# Patient Record
Sex: FEMALE | Race: AMERICAN INDIAN OR ALASKA NATIVE
[De-identification: names, ages, dates, MRNs, and addresses within clinical notes are randomized per-mention and may not be internally consistent; named-entity substitution may affect disease eponyms.]

---

## 2021-07-03 ENCOUNTER — HOSPITAL ENCOUNTER (EMERGENCY)
Dept: HOSPITAL 46 - ED | Age: 41
Discharge: HOME | End: 2021-07-03
Payer: COMMERCIAL

## 2021-07-03 VITALS — BODY MASS INDEX: 25.9 KG/M2 | WEIGHT: 164.99 LBS | HEIGHT: 67 IN

## 2021-07-03 DIAGNOSIS — Z79.899: ICD-10-CM

## 2021-07-03 DIAGNOSIS — Z79.52: ICD-10-CM

## 2021-07-03 DIAGNOSIS — U07.1: Primary | ICD-10-CM

## 2021-07-03 DIAGNOSIS — L25.9: ICD-10-CM

## 2021-07-03 DIAGNOSIS — F17.200: ICD-10-CM

## 2021-07-03 PROCEDURE — C9803 HOPD COVID-19 SPEC COLLECT: HCPCS

## 2021-07-03 PROCEDURE — U0003 INFECTIOUS AGENT DETECTION BY NUCLEIC ACID (DNA OR RNA); SEVERE ACUTE RESPIRATORY SYNDROME CORONAVIRUS 2 (SARS-COV-2) (CORONAVIRUS DISEASE [COVID-19]), AMPLIFIED PROBE TECHNIQUE, MAKING USE OF HIGH THROUGHPUT TECHNOLOGIES AS DESCRIBED BY CMS-2020-01-R: HCPCS

## 2022-04-17 ENCOUNTER — HOSPITAL ENCOUNTER (EMERGENCY)
Dept: HOSPITAL 46 - ED | Age: 42
Discharge: HOME | End: 2022-04-17
Payer: COMMERCIAL

## 2022-04-17 VITALS — WEIGHT: 164.99 LBS | HEIGHT: 67 IN | BODY MASS INDEX: 25.9 KG/M2

## 2022-04-17 DIAGNOSIS — Z79.52: ICD-10-CM

## 2022-04-17 DIAGNOSIS — F40.8: Primary | ICD-10-CM

## 2022-04-17 DIAGNOSIS — F17.200: ICD-10-CM

## 2022-04-17 DIAGNOSIS — M06.9: ICD-10-CM

## 2022-04-17 DIAGNOSIS — Z79.899: ICD-10-CM

## 2022-04-17 NOTE — XMS
PreManage Notification: JOHN LEE MRN:[id number]
 
Security Information
 
Security Events
No recent Security Events currently on file
 
 
 
CRITERIA MET
------------
- [facility] - 2 Visits in 30 Days
 
 
CARE PROVIDERS
There are no care providers on record at this time.
 
Rhiannon has no Care Guidelines for this patient.
 
ENDER VISIT COUNT (12 MO.)
-------------------------------------------------------------------------------------
3 [facility] CAROLYN
-------------------------------------------------------------------------------------
TOTAL 3
-------------------------------------------------------------------------------------
NOTE: Visits indicate total known visits.
 
ED/C VISIT TRACKING (12 MO.)
-------------------------------------------------------------------------------------
[facility]2022 [facility]
 
[facility]
 
COMPLAINT:
- VAGINAL DISCHARGE
-------------------------------------------------------------------------------------
04/14/2022 14:33
[facility]
 
TYPE: Emergency
 
COMPLAINT:
- HEAD PAIN
-------------------------------------------------------------------------------------
07/03/2021 13:30
[facility]
 
TYPE: Emergency
 
COMPLAINT:
- ITCHY SKIN
 
DIAGNOSES:
- Unspecified contact dermatitis, unspecified cause
- Nicotine dependence, unspecified, uncomplicated
- Long term (current) use of systemic steroids
- Rash and other nonspecific skin eruption
- Other long term (current) drug therapy
 
- COVID-19
-------------------------------------------------------------------------------------
 
 
INPATIENT VISIT TRACKING (12 MO.)
No inpatient visits to display in this time frame
 
[email or web address]

## 2022-09-03 ENCOUNTER — HOSPITAL ENCOUNTER (EMERGENCY)
Dept: HOSPITAL 46 - ED | Age: 42
Discharge: HOME | End: 2022-09-03
Payer: COMMERCIAL

## 2022-09-03 VITALS — WEIGHT: 148.59 LBS | HEIGHT: 67 IN | BODY MASS INDEX: 23.32 KG/M2

## 2022-09-03 DIAGNOSIS — L30.9: Primary | ICD-10-CM
